# Patient Record
(demographics unavailable — no encounter records)

---

## 2025-05-29 NOTE — PLAN
[FreeTextEntry1] : 33 y/o  at 18w0d (ANDREZ 10/30/25) with multiple fetal anomalies  Plan: - Records obtained from patient all in [place] - 1st OB labs and NIPS performed today, explained to patient NIPS is a screening test and only screens for certain chromosomal abnormalities, would recommend diagnostic testing for more complete analysis - recommend patient follow up with [facility] for an official scan, possible amniocentesis and genetic counseling; patient can then follow up with family planning to schedule D&E - patient anxious to have this done as soon as possible; reached out to [facility] to schedule patient, patient was to be scheduled tomorrow for consultation - M called patient to confirm appointment, patient states she already has appointment this Saturday for D&E; explained to patient that we would recommend testing prior to D&E to identify any preventable issues, patient understands and will proceed with D&E this weekend

## 2025-05-29 NOTE — HISTORY OF PRESENT ILLNESS
[FreeTextEntry1] : 33 y/o  at 18w0d (ANDREZ 10/30/25) presents for consultation for fetal anomalies. Patient has been receiving her prenatal care in [place] as her  is currently deployed in [place]. She has been staying with family. She had a routine 2nd trimester US which showed multiple fetal anomalies, including ventriculomegaly, rocker bottom feet, spinal issue. Patient has not had genetic testing. She is here today to discuss further steps, as she does not want to continue the pregnancy if fetal anomalies are confirmed.  OB: Nulliparous GYN: denies PMH: denies PSH: Denies FH: Denies Meds: denies Allergies: NKDA Social: denies toxic habits

## 2025-05-29 NOTE — PLAN
[FreeTextEntry1] : 31 y/o  at 18w0d (ANDREZ 10/30/25) with multiple fetal anomalies  Plan: - Records obtained from patient all in [place] - 1st OB labs and NIPS performed today, explained to patient NIPS is a screening test and only screens for certain chromosomal abnormalities, would recommend diagnostic testing for more complete analysis - recommend patient follow up with [facility] for an official scan, possible amniocentesis and genetic counseling; patient can then follow up with family planning to schedule D&E - patient anxious to have this done as soon as possible; reached out to [facility] to schedule patient, patient was to be scheduled tomorrow for consultation - M called patient to confirm appointment, patient states she already has appointment this Saturday for D&E; explained to patient that we would recommend testing prior to D&E to identify any preventable issues, patient understands and will proceed with D&E this weekend

## 2025-05-29 NOTE — HISTORY OF PRESENT ILLNESS
[FreeTextEntry1] : 31 y/o  at 18w0d (ANDREZ 10/30/25) presents for consultation for fetal anomalies. Patient has been receiving her prenatal care in [place] as her  is currently deployed in [place]. She has been staying with family. She had a routine 2nd trimester US which showed multiple fetal anomalies, including ventriculomegaly, rocker bottom feet, spinal issue. Patient has not had genetic testing. She is here today to discuss further steps, as she does not want to continue the pregnancy if fetal anomalies are confirmed.  OB: Nulliparous GYN: denies PMH: denies PSH: Denies FH: Denies Meds: denies Allergies: NKDA Social: denies toxic habits